# Patient Record
Sex: FEMALE | Race: WHITE | NOT HISPANIC OR LATINO | Employment: OTHER | URBAN - METROPOLITAN AREA
[De-identification: names, ages, dates, MRNs, and addresses within clinical notes are randomized per-mention and may not be internally consistent; named-entity substitution may affect disease eponyms.]

---

## 2023-10-16 PROBLEM — I25.10 CORONARY ARTERY DISEASE: Status: ACTIVE | Noted: 2023-10-16

## 2023-10-16 PROBLEM — I10 HYPERTENSION, BENIGN: Status: ACTIVE | Noted: 2023-10-16

## 2023-10-16 PROBLEM — R06.09 DYSPNEA ON EXERTION: Status: ACTIVE | Noted: 2023-10-16

## 2023-10-16 PROBLEM — R00.2 PALPITATIONS: Status: ACTIVE | Noted: 2023-10-16

## 2023-10-16 PROBLEM — E78.2 MIXED HYPERLIPIDEMIA: Status: ACTIVE | Noted: 2023-10-16

## 2023-10-16 PROBLEM — I47.10 PAROXYSMAL SUPRAVENTRICULAR TACHYCARDIA (CMS-HCC): Status: ACTIVE | Noted: 2023-10-16

## 2023-10-16 PROBLEM — I35.0 AORTIC STENOSIS: Status: ACTIVE | Noted: 2023-10-16

## 2023-10-16 RX ORDER — BUPROPION HYDROCHLORIDE 150 MG/1
150 TABLET ORAL DAILY
COMMUNITY

## 2023-10-16 RX ORDER — LOSARTAN POTASSIUM 50 MG/1
50 TABLET ORAL DAILY
COMMUNITY
End: 2024-01-12

## 2023-10-16 RX ORDER — ASPIRIN 81 MG/1
81 TABLET ORAL DAILY
COMMUNITY

## 2023-10-16 RX ORDER — CHOLECALCIFEROL (VITAMIN D3) 50 MCG
TABLET ORAL
COMMUNITY
Start: 2020-11-04

## 2023-10-16 RX ORDER — ROSUVASTATIN CALCIUM 20 MG/1
20 TABLET, COATED ORAL DAILY
COMMUNITY
End: 2023-11-29 | Stop reason: SDUPTHER

## 2023-10-16 RX ORDER — AMLODIPINE BESYLATE 5 MG/1
TABLET ORAL DAILY
COMMUNITY
End: 2023-10-18 | Stop reason: ALTCHOICE

## 2023-10-16 RX ORDER — LEVOTHYROXINE SODIUM 100 UG/1
100 TABLET ORAL
COMMUNITY

## 2023-10-18 ENCOUNTER — OFFICE VISIT (OUTPATIENT)
Dept: CARDIOLOGY | Facility: CLINIC | Age: 68
End: 2023-10-18
Payer: MEDICARE

## 2023-10-18 VITALS
WEIGHT: 222 LBS | DIASTOLIC BLOOD PRESSURE: 68 MMHG | OXYGEN SATURATION: 98 % | HEIGHT: 67 IN | BODY MASS INDEX: 34.84 KG/M2 | HEART RATE: 84 BPM | SYSTOLIC BLOOD PRESSURE: 110 MMHG

## 2023-10-18 DIAGNOSIS — I10 HYPERTENSION, BENIGN: ICD-10-CM

## 2023-10-18 DIAGNOSIS — R00.2 PALPITATIONS: ICD-10-CM

## 2023-10-18 DIAGNOSIS — I35.0 NONRHEUMATIC AORTIC VALVE STENOSIS: ICD-10-CM

## 2023-10-18 DIAGNOSIS — I47.10 PAROXYSMAL SUPRAVENTRICULAR TACHYCARDIA (CMS-HCC): ICD-10-CM

## 2023-10-18 DIAGNOSIS — E78.2 MIXED HYPERLIPIDEMIA: ICD-10-CM

## 2023-10-18 DIAGNOSIS — I25.10 CORONARY ARTERY DISEASE INVOLVING NATIVE CORONARY ARTERY OF NATIVE HEART WITHOUT ANGINA PECTORIS: Primary | ICD-10-CM

## 2023-10-18 DIAGNOSIS — R06.09 DYSPNEA ON EXERTION: ICD-10-CM

## 2023-10-18 PROCEDURE — 99214 OFFICE O/P EST MOD 30 MIN: CPT | Performed by: INTERNAL MEDICINE

## 2023-10-18 PROCEDURE — 3078F DIAST BP <80 MM HG: CPT | Performed by: INTERNAL MEDICINE

## 2023-10-18 PROCEDURE — 1159F MED LIST DOCD IN RCRD: CPT | Performed by: INTERNAL MEDICINE

## 2023-10-18 PROCEDURE — 1036F TOBACCO NON-USER: CPT | Performed by: INTERNAL MEDICINE

## 2023-10-18 PROCEDURE — 3074F SYST BP LT 130 MM HG: CPT | Performed by: INTERNAL MEDICINE

## 2023-10-18 RX ORDER — METOPROLOL SUCCINATE 25 MG/1
25 TABLET, EXTENDED RELEASE ORAL DAILY
Qty: 90 TABLET | Refills: 3 | Status: SHIPPED | OUTPATIENT
Start: 2023-10-18 | End: 2024-10-17

## 2023-10-18 NOTE — PATIENT INSTRUCTIONS
You have an electrical problem with your heart called supraventricular tachycardia.    Stop amlodipine 5 mg daily    For your tachycardia and racing heartbeat, start metoprolol ER 25 mg daily

## 2023-10-18 NOTE — PROGRESS NOTES
Rhett Hayes is a 68 y.o. female.    Chief Complaint:  Follow-up valvular heart disease and coronary disease.  Chest pain.    HPI    She describes chest pain.  It is in the right anterior chest area.  It lasts briefly no more than a few seconds.  Not related to activities.  No radiation of the discomfort.  She is here for follow-up of her Holter monitor.  She felt reasonably well during the monitoring.    She has had a recent cardiac evaluation. An echocardiographic study demonstrated moderate aortic stenosis. She also had a nuclear stress thallium study which showed no evidence of ischemia with normal left ventricular function. However during the stress test she is reported to have heart rates over 200.    Past medical history significant for hypertension. Cardiac risk factors include a positive family history of heart disease and that her father had a myocardial infarction in his 50s. Her brother had coronary artery bypass grafting. Mother  of cancer. She does have a history of hyperlipidemia.    Recently demonstrated to have renal insufficiency.     Patient's brother is Adonis Gabriel     Review of Systems   All other systems reviewed and are negative.      Objective   Constitutional:       Appearance: Not in distress.   Neck:      Vascular: JVD normal.   Pulmonary:      Breath sounds: Normal breath sounds.   Cardiovascular:      Normal rate. Regular rhythm. Normal S1. Normal S2.       Murmurs: There is no murmur.      No gallop.    Pulses:     Intact distal pulses.   Edema:     Peripheral edema absent.   Abdominal:      General: There is no distension.      Palpations: Abdomen is soft.   Neurological:      Mental Status: Alert.             Lab Review:   Lab Results   Component Value Date     (L) 2023    K 4.4 2023    CL 95 (L) 2023    CO2 28 2023    BUN 12 2023    CREATININE 1.28 (H) 2023    GLUCOSE 107 (H) 2023    CALCIUM 9.5 2023        Assessment/Plan     1.  Supraventricular tachycardia.  Brief bursts of supraventricular tachycardia.  We will start low-dose beta-blocker therapy.    2.  Hypertension.  Blood pressures are actually low.  Will discontinue amlodipine.    3.  Aortic stenosis.  Moderate by exam.  We discussed with the patient that she would not get any benefit with intervention on her aortic valve.  She also does not reach criteria for intervention on her aortic valve.    4.  Hyperlipidemia.  Cholesterol 135, HDL 59, LDL 54.

## 2023-10-20 ENCOUNTER — TELEPHONE (OUTPATIENT)
Dept: CARDIOLOGY | Facility: CLINIC | Age: 68
End: 2023-10-20
Payer: MEDICARE

## 2023-10-20 NOTE — TELEPHONE ENCOUNTER
Pt was just here for appt with Dr. Hurley on 10/19/23 and he prescribed her Metoprolol 25 mg, PT remembered she had taken these pills before from another doctor--50mg---but was gaining weight on them so that doctor took her off of them. Pt is wondering now what to do, take them still? Get something else? Please call to advise.

## 2023-10-23 NOTE — TELEPHONE ENCOUNTER
Called and notified pt to start Metoprolol as ordered per Dr. Hurley. Pt verbalizes understanding.

## 2023-11-29 DIAGNOSIS — E78.2 MIXED HYPERLIPIDEMIA: Primary | ICD-10-CM

## 2023-11-29 RX ORDER — ROSUVASTATIN CALCIUM 20 MG/1
20 TABLET, COATED ORAL DAILY
Qty: 90 TABLET | Refills: 3 | Status: SHIPPED | OUTPATIENT
Start: 2023-11-29 | End: 2023-12-01 | Stop reason: SDUPTHER

## 2023-12-01 DIAGNOSIS — E78.2 MIXED HYPERLIPIDEMIA: Primary | ICD-10-CM

## 2023-12-03 RX ORDER — ROSUVASTATIN CALCIUM 20 MG/1
20 TABLET, COATED ORAL DAILY
Qty: 90 TABLET | Refills: 3 | Status: SHIPPED | OUTPATIENT
Start: 2023-12-03

## 2024-01-12 DIAGNOSIS — I10 HYPERTENSION, BENIGN: Primary | ICD-10-CM

## 2024-01-12 RX ORDER — LOSARTAN POTASSIUM 50 MG/1
TABLET ORAL
Qty: 90 TABLET | Refills: 3 | Status: SHIPPED | OUTPATIENT
Start: 2024-01-12

## 2024-04-11 PROBLEM — N18.31 STAGE 3A CHRONIC KIDNEY DISEASE (MULTI): Status: ACTIVE | Noted: 2023-06-11

## 2024-04-24 ENCOUNTER — APPOINTMENT (OUTPATIENT)
Dept: CARDIOLOGY | Facility: CLINIC | Age: 69
End: 2024-04-24
Payer: MEDICARE

## 2024-05-28 ENCOUNTER — HOSPITAL ENCOUNTER (OUTPATIENT)
Dept: CARDIOLOGY | Facility: CLINIC | Age: 69
Discharge: HOME | End: 2024-05-28
Payer: MEDICARE

## 2024-05-28 ENCOUNTER — OFFICE VISIT (OUTPATIENT)
Dept: CARDIOLOGY | Facility: CLINIC | Age: 69
End: 2024-05-28
Payer: MEDICARE

## 2024-05-28 VITALS — BODY MASS INDEX: 34.77 KG/M2 | WEIGHT: 222 LBS

## 2024-05-28 VITALS
HEIGHT: 67 IN | DIASTOLIC BLOOD PRESSURE: 80 MMHG | BODY MASS INDEX: 34.21 KG/M2 | WEIGHT: 218 LBS | SYSTOLIC BLOOD PRESSURE: 124 MMHG | HEART RATE: 62 BPM | OXYGEN SATURATION: 98 %

## 2024-05-28 DIAGNOSIS — I35.0 NONRHEUMATIC AORTIC VALVE STENOSIS: ICD-10-CM

## 2024-05-28 DIAGNOSIS — I25.10 CORONARY ARTERY DISEASE INVOLVING NATIVE CORONARY ARTERY OF NATIVE HEART WITHOUT ANGINA PECTORIS: Primary | ICD-10-CM

## 2024-05-28 DIAGNOSIS — I35.0 MODERATE AORTIC STENOSIS: ICD-10-CM

## 2024-05-28 DIAGNOSIS — I10 HYPERTENSION, BENIGN: ICD-10-CM

## 2024-05-28 DIAGNOSIS — E78.2 MIXED HYPERLIPIDEMIA: ICD-10-CM

## 2024-05-28 DIAGNOSIS — R00.2 PALPITATIONS: ICD-10-CM

## 2024-05-28 DIAGNOSIS — R06.09 DYSPNEA ON EXERTION: ICD-10-CM

## 2024-05-28 DIAGNOSIS — I25.10 CORONARY ARTERY DISEASE INVOLVING NATIVE CORONARY ARTERY OF NATIVE HEART WITHOUT ANGINA PECTORIS: ICD-10-CM

## 2024-05-28 DIAGNOSIS — I47.10 PAROXYSMAL SUPRAVENTRICULAR TACHYCARDIA (CMS-HCC): ICD-10-CM

## 2024-05-28 PROCEDURE — 3074F SYST BP LT 130 MM HG: CPT | Performed by: INTERNAL MEDICINE

## 2024-05-28 PROCEDURE — 3079F DIAST BP 80-89 MM HG: CPT | Performed by: INTERNAL MEDICINE

## 2024-05-28 PROCEDURE — 93306 TTE W/DOPPLER COMPLETE: CPT | Performed by: INTERNAL MEDICINE

## 2024-05-28 PROCEDURE — 1036F TOBACCO NON-USER: CPT | Performed by: INTERNAL MEDICINE

## 2024-05-28 PROCEDURE — 99214 OFFICE O/P EST MOD 30 MIN: CPT | Performed by: INTERNAL MEDICINE

## 2024-05-28 PROCEDURE — 1159F MED LIST DOCD IN RCRD: CPT | Performed by: INTERNAL MEDICINE

## 2024-05-28 PROCEDURE — 93306 TTE W/DOPPLER COMPLETE: CPT

## 2024-05-28 ASSESSMENT — ENCOUNTER SYMPTOMS: DYSPNEA ON EXERTION: 1

## 2024-05-28 NOTE — PATIENT INSTRUCTIONS
"Your echocardiogram shows moderate narrowing of the aortic valve.  It does not need \"fixing\" at this time.    No restrictions on your activities.  "

## 2024-05-28 NOTE — PROGRESS NOTES
Rhett   Lety Abigail is a 69 y.o. female.    Chief Complaint:  Follow-up aortic stenosis.    HPI    She has complaints of fatigue and generalized weakness.  Has had rare episodes of discomfort in the right anterior chest lasting for few seconds.  No typical pressure or heaviness in the chest.  Has exertional dyspnea but only when she goes up a flight of stairs carrying something.  No syncopal or near syncopal events.    She has had a recent cardiac evaluation. An echocardiographic study demonstrated moderate aortic stenosis. She also had a nuclear stress thallium study which showed no evidence of ischemia with normal left ventricular function. However during the stress test she is reported to have heart rates over 200.     Past medical history significant for hypertension. Cardiac risk factors include a positive family history of heart disease and that her father had a myocardial infarction in his 50s. Her brother had coronary artery bypass grafting. Mother  of cancer. She does have a history of hyperlipidemia.     Recently demonstrated to have renal insufficiency.      Patient's brother is Adonis Gabriel     Allergies  Medication    · Tylenol with Codeine   Recorded By: Siri Quiroz; 2020 12:09:43 PM     Family History  Mother    · Family history of malignant neoplasm of ovary (V16.41) (Z80.41)  Father    · Family history of cardiomegaly (V17.49) (Z82.49)   · Family history of coronary artery disease (V17.3) (Z82.49)   · FH: CABG (coronary artery bypass surgery) (V17.3) (Z82.49)  Brother    · FH: CABG (coronary artery bypass surgery) (V17.3) (Z82.49)       Social History  Problems    · Does not use illicit drugs (V49.89) (Z78.9)   · Former smoker (V15.82) (Z87.891)   · quit    · No alcohol use    Review of Systems   Constitutional: Positive for malaise/fatigue.   Cardiovascular:  Positive for dyspnea on exertion.   Musculoskeletal:  Positive for arthritis.   All other systems reviewed and  "are negative.      Current Outpatient Medications   Medication Sig Dispense Refill    aspirin 81 mg EC tablet Take 1 tablet (81 mg) by mouth once daily.      buPROPion XL (Wellbutrin XL) 150 mg 24 hr tablet Take 1 tablet (150 mg) by mouth once daily. Do not crush, chew, or split.      cholecalciferol (Vitamin D-3) 50 MCG (2000 UT) tablet Take by mouth.      levothyroxine (Synthroid, Levoxyl) 100 mcg tablet Take 1 tablet (100 mcg) by mouth once daily in the morning. Take before meals.      losartan (Cozaar) 50 mg tablet Take 1 tab po daily 90 tablet 3    metoprolol succinate XL (Toprol XL) 25 mg 24 hr tablet Take 1 tablet (25 mg) by mouth once daily. Do not crush or chew. 90 tablet 3    rosuvastatin (Crestor) 20 mg tablet Take 1 tablet (20 mg) by mouth once daily. 90 tablet 3     No current facility-administered medications for this visit.        Visit Vitals  /80 (BP Location: Left arm)   Pulse 62   Ht 1.702 m (5' 7\")   Wt 98.9 kg (218 lb)   SpO2 98%   BMI 34.14 kg/m²   Smoking Status Former   BSA 2.16 m²        Objective     Constitutional:       Appearance: Not in distress.   Neck:      Vascular: JVD normal.   Pulmonary:      Breath sounds: Normal breath sounds.   Cardiovascular:      Normal rate. Regular rhythm. Normal S1. Normal S2.       Murmurs: There is a grade 3/6 systolic murmur.      No gallop.    Pulses:     Intact distal pulses.   Edema:     Peripheral edema absent.   Abdominal:      General: There is no distension.      Palpations: Abdomen is soft.   Neurological:      Mental Status: Alert.         Lab Review:   Lab Results   Component Value Date     (L) 04/02/2023    K 4.4 04/02/2023    CL 95 (L) 04/02/2023    CO2 28 04/02/2023    BUN 12 04/02/2023    CREATININE 1.28 (H) 04/02/2023    GLUCOSE 107 (H) 04/02/2023    CALCIUM 9.5 04/02/2023       Assessment:    1.  Aortic stenosis.  Her degree of aortic stenosis is moderate on the basis of today's echocardiographic study.  She does not reach " any criteria for aortic valve replacement at this point in time.    2.  Supraventricular tachycardia.  Had 1 episode of supraventricular tachycardia since our last visit 6 months ago.  This was a few weeks ago.  This self terminated.  Her heart rate was 180.    3.  Hypertension.  Blood pressures are normal.    4.  Hyperlipidemia.  Cholesterol is 150, HDL 57, LDL 68.

## 2024-05-29 LAB
AORTIC VALVE MEAN GRADIENT: 22.5 MMHG
AORTIC VALVE PEAK VELOCITY: 3.19 M/S
AV PEAK GRADIENT: 40.7 MMHG
AVA (PEAK VEL): 0.91 CM2
AVA (VTI): 0.87 CM2
EJECTION FRACTION APICAL 4 CHAMBER: 68.2
LEFT VENTRICLE INTERNAL DIMENSION DIASTOLE: 4.18 CM (ref 3.5–6)
LEFT VENTRICULAR OUTFLOW TRACT DIAMETER: 1.96 CM
LV EJECTION FRACTION BIPLANE: 66 %
MITRAL VALVE E/A RATIO: 0.69
RIGHT VENTRICLE FREE WALL PEAK S': 13 CM/S
TRICUSPID ANNULAR PLANE SYSTOLIC EXCURSION: 1.9 CM

## 2024-07-05 DIAGNOSIS — I47.10 PAROXYSMAL SUPRAVENTRICULAR TACHYCARDIA (CMS-HCC): ICD-10-CM

## 2024-07-05 DIAGNOSIS — I25.10 CORONARY ARTERY DISEASE INVOLVING NATIVE CORONARY ARTERY OF NATIVE HEART WITHOUT ANGINA PECTORIS: ICD-10-CM

## 2024-07-08 RX ORDER — METOPROLOL SUCCINATE 25 MG/1
25 TABLET, EXTENDED RELEASE ORAL DAILY
Qty: 100 TABLET | Refills: 2 | Status: SHIPPED | OUTPATIENT
Start: 2024-07-08

## 2024-08-10 DIAGNOSIS — E78.2 MIXED HYPERLIPIDEMIA: Primary | ICD-10-CM

## 2024-08-12 RX ORDER — ROSUVASTATIN CALCIUM 20 MG/1
20 TABLET, COATED ORAL DAILY
Qty: 100 TABLET | Refills: 2 | Status: SHIPPED | OUTPATIENT
Start: 2024-08-12

## 2024-09-19 DIAGNOSIS — I10 HYPERTENSION, BENIGN: ICD-10-CM

## 2024-09-20 RX ORDER — LOSARTAN POTASSIUM 50 MG/1
50 TABLET ORAL DAILY
Qty: 100 TABLET | Refills: 2 | Status: SHIPPED | OUTPATIENT
Start: 2024-09-20

## 2024-10-24 PROBLEM — E66.811 OBESITY, CLASS I, BMI 30-34.9: Status: ACTIVE | Noted: 2024-03-01

## 2024-10-24 PROBLEM — E07.9 DISORDER OF THYROID: Status: ACTIVE | Noted: 2024-10-24

## 2024-11-13 ENCOUNTER — APPOINTMENT (OUTPATIENT)
Dept: CARDIOLOGY | Facility: CLINIC | Age: 69
End: 2024-11-13
Payer: MEDICARE

## 2024-11-13 VITALS
DIASTOLIC BLOOD PRESSURE: 90 MMHG | WEIGHT: 221 LBS | HEART RATE: 66 BPM | OXYGEN SATURATION: 98 % | HEIGHT: 67 IN | BODY MASS INDEX: 34.69 KG/M2 | SYSTOLIC BLOOD PRESSURE: 160 MMHG

## 2024-11-13 DIAGNOSIS — I10 HYPERTENSION, BENIGN: ICD-10-CM

## 2024-11-13 DIAGNOSIS — I35.0 MODERATE AORTIC STENOSIS: ICD-10-CM

## 2024-11-13 DIAGNOSIS — I47.10 PAROXYSMAL SUPRAVENTRICULAR TACHYCARDIA (CMS-HCC): ICD-10-CM

## 2024-11-13 DIAGNOSIS — E78.2 MIXED HYPERLIPIDEMIA: ICD-10-CM

## 2024-11-13 DIAGNOSIS — R06.09 DYSPNEA ON EXERTION: ICD-10-CM

## 2024-11-13 DIAGNOSIS — I25.10 CORONARY ARTERY DISEASE INVOLVING NATIVE CORONARY ARTERY OF NATIVE HEART WITHOUT ANGINA PECTORIS: Primary | ICD-10-CM

## 2024-11-13 DIAGNOSIS — R00.2 PALPITATIONS: ICD-10-CM

## 2024-11-13 LAB
ATRIAL RATE: 68 BPM
P AXIS: 49 DEGREES
P OFFSET: 210 MS
P ONSET: 159 MS
PR INTERVAL: 128 MS
Q ONSET: 223 MS
QRS COUNT: 11 BEATS
QRS DURATION: 86 MS
QT INTERVAL: 404 MS
QTC CALCULATION(BAZETT): 429 MS
QTC FREDERICIA: 421 MS
R AXIS: 34 DEGREES
T AXIS: 49 DEGREES
T OFFSET: 425 MS
VENTRICULAR RATE: 68 BPM

## 2024-11-13 PROCEDURE — 3008F BODY MASS INDEX DOCD: CPT | Performed by: INTERNAL MEDICINE

## 2024-11-13 PROCEDURE — 99214 OFFICE O/P EST MOD 30 MIN: CPT | Performed by: INTERNAL MEDICINE

## 2024-11-13 PROCEDURE — 3080F DIAST BP >= 90 MM HG: CPT | Performed by: INTERNAL MEDICINE

## 2024-11-13 PROCEDURE — 1036F TOBACCO NON-USER: CPT | Performed by: INTERNAL MEDICINE

## 2024-11-13 PROCEDURE — 3077F SYST BP >= 140 MM HG: CPT | Performed by: INTERNAL MEDICINE

## 2024-11-13 PROCEDURE — 1159F MED LIST DOCD IN RCRD: CPT | Performed by: INTERNAL MEDICINE

## 2024-11-13 PROCEDURE — 93005 ELECTROCARDIOGRAM TRACING: CPT | Performed by: INTERNAL MEDICINE

## 2024-11-13 RX ORDER — LOSARTAN POTASSIUM 100 MG/1
100 TABLET ORAL DAILY
Qty: 90 TABLET | Refills: 3 | Status: SHIPPED | OUTPATIENT
Start: 2024-11-13 | End: 2025-11-13

## 2024-11-13 RX ORDER — AMLODIPINE BESYLATE 5 MG/1
TABLET ORAL
COMMUNITY
End: 2024-11-13 | Stop reason: WASHOUT

## 2024-11-13 RX ORDER — TRIAMTERENE/HYDROCHLOROTHIAZID 37.5-25 MG
1 TABLET ORAL DAILY
Qty: 90 TABLET | Refills: 3 | Status: SHIPPED | OUTPATIENT
Start: 2024-11-13 | End: 2025-11-13

## 2024-11-13 ASSESSMENT — ENCOUNTER SYMPTOMS
SLEEP DISTURBANCES DUE TO BREATHING: 1
DYSPNEA ON EXERTION: 1
SHORTNESS OF BREATH: 1

## 2024-11-13 NOTE — PATIENT INSTRUCTIONS
Take metoprolol succinate 25 mg 1/2 tablet daily    Start triamterene-hydrochlorothiazide 37.5-25 mg daily    Increase the losartan to 100 mg daily    Blood test in two weeks    We will see you back in 6 months with an echocardiogram

## 2024-11-13 NOTE — PROGRESS NOTES
Rhett Hayes is a 69 y.o. female.    Chief Complaint:  Follow-up coronary artery disease and aortic stenosis.    HPI    Since her last visit she complains of fatigue and poor energy levels.  She feels that she tires very quickly.  No typical anginal symptoms.  No syncopal events.  No orthopnea or paroxysmal nocturnal dyspnea.  Does have problems where she feels fatigue and feels like she is going to go to sleep during the day.  She does not give any symptoms to suggest sleep apnea.    A coronary CT calcium scar done in 2023 was 528.    She has had a recent cardiac evaluation. An echocardiographic study demonstrated moderate aortic stenosis. She also had a nuclear stress thallium study which showed no evidence of ischemia with normal left ventricular function. However during the stress test she is reported to have heart rates over 200.     Past medical history significant for hypertension. Cardiac risk factors include a positive family history of heart disease and that her father had a myocardial infarction in his 50s. Her brother had coronary artery bypass grafting. Mother  of cancer. She does have a history of hyperlipidemia.     Recently demonstrated to have renal insufficiency.      Patient's brother is Adonis Gabriel     Review of Systems   Constitutional: Positive for malaise/fatigue.   Cardiovascular:  Positive for dyspnea on exertion.   Respiratory:  Positive for shortness of breath and sleep disturbances due to breathing.    Musculoskeletal:  Positive for arthritis and joint pain.   All other systems reviewed and are negative.      Current Outpatient Medications   Medication Sig Dispense Refill    aspirin 81 mg EC tablet Take 1 tablet (81 mg) by mouth once daily.      buPROPion XL (Wellbutrin XL) 150 mg 24 hr tablet Take 1 tablet (150 mg) by mouth once daily. Do not crush, chew, or split.      cholecalciferol (Vitamin D-3) 50 MCG (2000 UT) tablet Take by mouth.      levothyroxine  (Synthroid, Levoxyl) 100 mcg tablet Take 1 tablet (100 mcg) by mouth once daily in the morning. Take before meals.      losartan (Cozaar) 50 mg tablet TAKE 1 TABLET BY MOUTH DAILY 100 tablet 2    metoprolol succinate XL (Toprol-XL) 25 mg 24 hr tablet TAKE 1 TABLET BY MOUTH ONCE  DAILY DO NOT CRUSH OR CHEW 100 tablet 2    rosuvastatin (Crestor) 20 mg tablet TAKE 1 TABLET BY MOUTH ONCE  DAILY 100 tablet 2     No current facility-administered medications for this visit.        Visit Vitals  Smoking Status Former        Objective     Constitutional:       Appearance: Not in distress.   Neck:      Vascular: JVD normal.   Pulmonary:      Breath sounds: Normal breath sounds.   Cardiovascular:      Normal rate. Regular rhythm. Normal S1. Normal S2.       Murmurs: There is a grade 3/6 systolic murmur.      No gallop.    Pulses:     Intact distal pulses.   Edema:     Peripheral edema absent.   Abdominal:      General: There is no distension.      Palpations: Abdomen is soft.   Neurological:      Mental Status: Alert.         Lab Review:   Lab Results   Component Value Date     (L) 04/02/2023    K 4.4 04/02/2023    CL 95 (L) 04/02/2023    CO2 28 04/02/2023    BUN 12 04/02/2023    CREATININE 1.28 (H) 04/02/2023    GLUCOSE 107 (H) 04/02/2023    CALCIUM 9.5 04/02/2023     Assessment:    1.  Coronary artery disease.  Based on a positive CT calcium score of 528.  This study was done in March 2023.  She had a follow-up PET CT scan which showed no evidence of ischemia with an ejection fraction of 76%.  This was done in 2023.  No typical anginal symptoms, will continue medical management.    2.  Aortic stenosis.  Moderate by exam.  Will repeat echo study.    3.  Paroxysmal supraventricular tachycardia.  Over the past 6 months she has had very brief episodes but no sustained arrhythmias.    4.  Hyperlipidemia.  Cholesterol 150, HDL 57, LDL 68.    5.  Hypertension.  Not well-controlled.  Fatigue may be possibly related to her  metoprolol so we will cut back on the dose.  We are going to increase her losartan to 100 mg daily.  Also add triamterene HCTZ.  Have her get blood work in a few weeks after these changes.    Will see her in follow-up in 6 months with a repeat echo study.

## 2024-11-27 ENCOUNTER — TELEPHONE (OUTPATIENT)
Dept: CARDIOLOGY | Facility: CLINIC | Age: 69
End: 2024-11-27

## 2024-11-27 ENCOUNTER — LAB (OUTPATIENT)
Dept: LAB | Facility: LAB | Age: 69
End: 2024-11-27
Payer: MEDICARE

## 2024-11-27 DIAGNOSIS — I10 HYPERTENSION, BENIGN: ICD-10-CM

## 2024-11-27 LAB
ANION GAP SERPL CALC-SCNC: 11 MMOL/L (ref 10–20)
BUN SERPL-MCNC: 39 MG/DL (ref 6–23)
CALCIUM SERPL-MCNC: 9.8 MG/DL (ref 8.6–10.3)
CHLORIDE SERPL-SCNC: 100 MMOL/L (ref 98–107)
CO2 SERPL-SCNC: 30 MMOL/L (ref 21–32)
CREAT SERPL-MCNC: 2.85 MG/DL (ref 0.5–1.05)
EGFRCR SERPLBLD CKD-EPI 2021: 17 ML/MIN/1.73M*2
GLUCOSE SERPL-MCNC: 93 MG/DL (ref 74–99)
POTASSIUM SERPL-SCNC: 4 MMOL/L (ref 3.5–5.3)
SODIUM SERPL-SCNC: 137 MMOL/L (ref 136–145)

## 2024-11-27 PROCEDURE — 36415 COLL VENOUS BLD VENIPUNCTURE: CPT

## 2024-11-27 PROCEDURE — 80048 BASIC METABOLIC PNL TOTAL CA: CPT

## 2024-11-27 RX ORDER — HYDROCHLOROTHIAZIDE 12.5 MG/1
12.5 TABLET ORAL DAILY
Qty: 30 TABLET | Refills: 11 | OUTPATIENT
Start: 2024-11-27 | End: 2025-11-27

## 2024-11-27 NOTE — TELEPHONE ENCOUNTER
PT came in , Doctor put her on Maxzide 25mg  1x a day and upped her losartin  from 50 - 100mg  1x a day---she started this on Nov. 13/14th but she says when she gets up she feels like she's going to pass out, she has to stand for a bit and then she's ok, but then she feels off the rest of the day until the next morning until, she takes the meds again. PT does not know whether to take the new med or not, or ween off it , or is it the increased losartin. Please call to advise.

## 2024-11-27 NOTE — TELEPHONE ENCOUNTER
I did a BP check on patient and sitting was 140/70 and standing was 100/70. HR unchanged at 70. Discussed with Dr. Hurley and he discontinued Maxzide 25mg and changed to hydrochlorothiazide 12.5mg daily. No other changes. I called it in to patients pharmacy and she is picking it up now.

## 2025-03-18 DIAGNOSIS — I47.10 PAROXYSMAL SUPRAVENTRICULAR TACHYCARDIA (CMS-HCC): ICD-10-CM

## 2025-03-18 DIAGNOSIS — I25.10 CORONARY ARTERY DISEASE INVOLVING NATIVE CORONARY ARTERY OF NATIVE HEART WITHOUT ANGINA PECTORIS: ICD-10-CM

## 2025-03-18 RX ORDER — METOPROLOL SUCCINATE 25 MG/1
25 TABLET, EXTENDED RELEASE ORAL DAILY
Qty: 100 TABLET | Refills: 2 | Status: SHIPPED | OUTPATIENT
Start: 2025-03-18

## 2025-04-24 DIAGNOSIS — E78.2 MIXED HYPERLIPIDEMIA: ICD-10-CM

## 2025-04-25 RX ORDER — ROSUVASTATIN CALCIUM 20 MG/1
20 TABLET, COATED ORAL DAILY
Qty: 100 TABLET | Refills: 2 | Status: SHIPPED | OUTPATIENT
Start: 2025-04-25

## 2025-05-22 ENCOUNTER — OFFICE VISIT (OUTPATIENT)
Dept: CARDIOLOGY | Facility: CLINIC | Age: 70
End: 2025-05-22
Payer: MEDICARE

## 2025-05-22 ENCOUNTER — HOSPITAL ENCOUNTER (OUTPATIENT)
Dept: CARDIOLOGY | Facility: CLINIC | Age: 70
Discharge: HOME | End: 2025-05-22
Payer: MEDICARE

## 2025-05-22 VITALS
OXYGEN SATURATION: 92 % | WEIGHT: 227 LBS | HEART RATE: 73 BPM | SYSTOLIC BLOOD PRESSURE: 138 MMHG | HEIGHT: 67 IN | BODY MASS INDEX: 35.63 KG/M2 | DIASTOLIC BLOOD PRESSURE: 74 MMHG

## 2025-05-22 DIAGNOSIS — I47.10 PAROXYSMAL SUPRAVENTRICULAR TACHYCARDIA: ICD-10-CM

## 2025-05-22 DIAGNOSIS — E78.2 MIXED HYPERLIPIDEMIA: ICD-10-CM

## 2025-05-22 DIAGNOSIS — I25.10 CORONARY ARTERY DISEASE INVOLVING NATIVE CORONARY ARTERY OF NATIVE HEART WITHOUT ANGINA PECTORIS: ICD-10-CM

## 2025-05-22 DIAGNOSIS — R06.09 DYSPNEA ON EXERTION: ICD-10-CM

## 2025-05-22 DIAGNOSIS — I35.0 MODERATE AORTIC STENOSIS: Primary | ICD-10-CM

## 2025-05-22 DIAGNOSIS — I35.0 MODERATE AORTIC STENOSIS: ICD-10-CM

## 2025-05-22 DIAGNOSIS — I10 HYPERTENSION, BENIGN: ICD-10-CM

## 2025-05-22 DIAGNOSIS — N18.31 STAGE 3A CHRONIC KIDNEY DISEASE (MULTI): ICD-10-CM

## 2025-05-22 DIAGNOSIS — R00.2 PALPITATIONS: ICD-10-CM

## 2025-05-22 PROCEDURE — 1159F MED LIST DOCD IN RCRD: CPT | Performed by: INTERNAL MEDICINE

## 2025-05-22 PROCEDURE — 93306 TTE W/DOPPLER COMPLETE: CPT

## 2025-05-22 PROCEDURE — 3075F SYST BP GE 130 - 139MM HG: CPT | Performed by: INTERNAL MEDICINE

## 2025-05-22 PROCEDURE — 1036F TOBACCO NON-USER: CPT | Performed by: INTERNAL MEDICINE

## 2025-05-22 PROCEDURE — 3078F DIAST BP <80 MM HG: CPT | Performed by: INTERNAL MEDICINE

## 2025-05-22 PROCEDURE — 99213 OFFICE O/P EST LOW 20 MIN: CPT | Mod: 25 | Performed by: INTERNAL MEDICINE

## 2025-05-22 PROCEDURE — 99214 OFFICE O/P EST MOD 30 MIN: CPT | Performed by: INTERNAL MEDICINE

## 2025-05-22 PROCEDURE — 93306 TTE W/DOPPLER COMPLETE: CPT | Performed by: INTERNAL MEDICINE

## 2025-05-22 PROCEDURE — 3008F BODY MASS INDEX DOCD: CPT | Performed by: INTERNAL MEDICINE

## 2025-05-22 RX ORDER — METOPROLOL SUCCINATE 25 MG/1
25 TABLET, EXTENDED RELEASE ORAL DAILY
Qty: 90 TABLET | Refills: 3 | Status: SHIPPED | OUTPATIENT
Start: 2025-05-22 | End: 2026-05-22

## 2025-05-22 RX ORDER — HYDROCHLOROTHIAZIDE 12.5 MG/1
12.5 TABLET ORAL DAILY
Qty: 90 TABLET | Refills: 3 | Status: SHIPPED | OUTPATIENT
Start: 2025-05-22 | End: 2026-05-22

## 2025-05-22 RX ORDER — LOSARTAN POTASSIUM 100 MG/1
100 TABLET ORAL DAILY
Qty: 90 TABLET | Refills: 3 | Status: SHIPPED | OUTPATIENT
Start: 2025-05-22 | End: 2026-05-22

## 2025-05-22 RX ORDER — ROSUVASTATIN CALCIUM 20 MG/1
20 TABLET, COATED ORAL DAILY
Qty: 90 TABLET | Refills: 3 | Status: SHIPPED | OUTPATIENT
Start: 2025-05-22 | End: 2026-05-22

## 2025-05-22 NOTE — PROGRESS NOTES
"Subjective   Lety Abigail is a 69 y.o. female.    Chief Complaint:  Follow-up aortic stenosis, coronary artery disease.    HPI    She complains of more fatigue.  Tires easily.  More shortness of breath with exertion.  No chest discomfort.  Has some problems with sleep.  However does not sleep during the day.  Has a history of thyroid issues followed by endocrinology.  Also a history of chronic renal sufficiency.    A coronary CT calcium scar done in 2023 was 528.     She has had a recent cardiac evaluation. An echocardiographic study demonstrated moderate aortic stenosis. She also had a nuclear stress thallium study which showed no evidence of ischemia with normal left ventricular function. However during the stress test she is reported to have heart rates over 200.     Past medical history significant for hypertension. Cardiac risk factors include a positive family history of heart disease and that her father had a myocardial infarction in his 50s. Her brother had coronary artery bypass grafting. Mother  of cancer. She does have a history of hyperlipidemia.     Recently demonstrated to have renal insufficiency.      Patient's brother is Adonis Gabriel      Review of Systems   Constitutional: Positive for malaise/fatigue.   Cardiovascular:  Positive for dyspnea on exertion.   Respiratory:  Positive for shortness of breath and sleep disturbances due to breathing.    Musculoskeletal:  Positive for arthritis and joint pain.   All other systems reviewed and are negative.      Current Medications[1]     Visit Vitals  /74 (BP Location: Left arm)   Pulse 73   Ht 1.702 m (5' 7\")   Wt 103 kg (227 lb)   SpO2 92%   BMI 35.55 kg/m²   Smoking Status Former   BSA 2.21 m²        Objective     Constitutional:       Appearance: Not in distress.   Neck:      Vascular: JVD normal.   Pulmonary:      Breath sounds: Normal breath sounds.   Cardiovascular:      Normal rate. Regular rhythm. distant S1. distant S2.       " Murmurs: There is a grade 3/6 systolic murmur.      No gallop.    Pulses:     Intact distal pulses.   Edema:     Peripheral edema absent.   Abdominal:      General: There is no distension.      Palpations: Abdomen is soft.   Neurological:      Mental Status: Alert.         Lab Review:   Lab Results   Component Value Date     11/27/2024    K 4.0 11/27/2024     11/27/2024    CO2 30 11/27/2024    BUN 39 (H) 11/27/2024    CREATININE 2.85 (H) 11/27/2024    GLUCOSE 93 11/27/2024    CALCIUM 9.8 11/27/2024     Lab Results   Component Value Date    WBC 12.0 (H) 04/02/2023    HGB 13.4 04/02/2023    HCT 41.2 04/02/2023    MCV 94 04/02/2023     04/02/2023       Assessment:    1.  Coronary disease.  Based on CT calcium score of 528.  No anginal symptoms.    2.  Aortic stenosis.  There has been significant progression up her aortic stenosis.  Given her degree of progression it is possible that she may require intervention on her aortic valve in the next year.    3.  Renal failure.  Latest labs show a creatinine of 2.8.  We are going to repeat her laboratory data.    4.  PSVT.  No symptoms of tachycardia.         [1]   Current Outpatient Medications   Medication Sig Dispense Refill    aspirin 81 mg EC tablet Take 1 tablet (81 mg) by mouth once daily.      buPROPion XL (Wellbutrin XL) 150 mg 24 hr tablet Take 1 tablet (150 mg) by mouth once daily. Do not crush, chew, or split.      cholecalciferol (Vitamin D-3) 50 MCG (2000 UT) tablet Take by mouth.      hydroCHLOROthiazide (Microzide) 12.5 mg tablet Take 1 tablet (12.5 mg) by mouth once daily. 30 tablet 11    levothyroxine (Synthroid, Levoxyl) 100 mcg tablet Take 1 tablet (100 mcg) by mouth once daily in the morning. Take before meals.      losartan (Cozaar) 100 mg tablet Take 1 tablet (100 mg) by mouth once daily. 90 tablet 3    metoprolol succinate XL (Toprol-XL) 25 mg 24 hr tablet TAKE 1 TABLET BY MOUTH ONCE  DAILY DO NOT CRUSH OR CHEW 100 tablet 2     rosuvastatin (Crestor) 20 mg tablet TAKE 1 TABLET BY MOUTH ONCE  DAILY 100 tablet 2     No current facility-administered medications for this visit.

## 2025-05-23 LAB
AORTIC VALVE MEAN GRADIENT: 28 MMHG
AORTIC VALVE PEAK VELOCITY: 3.8 M/S
AV PEAK GRADIENT: 58 MMHG
AVA (PEAK VEL): 1.05 CM2
AVA (VTI): 1.18 CM2
EJECTION FRACTION APICAL 4 CHAMBER: 65.7
EJECTION FRACTION: 63 %
LEFT ATRIUM VOLUME AREA LENGTH INDEX BSA: 27.9 ML/M2
LEFT VENTRICLE INTERNAL DIMENSION DIASTOLE: 4.82 CM (ref 3.5–6)
LEFT VENTRICULAR OUTFLOW TRACT DIAMETER: 2.08 CM
MITRAL VALVE E/A RATIO: 0.72
RIGHT VENTRICLE FREE WALL PEAK S': 0.11 CM/S
RIGHT VENTRICLE PEAK SYSTOLIC PRESSURE: 19.8 MMHG
TRICUSPID ANNULAR PLANE SYSTOLIC EXCURSION: 2.3 CM